# Patient Record
Sex: FEMALE | Race: ASIAN | NOT HISPANIC OR LATINO
[De-identification: names, ages, dates, MRNs, and addresses within clinical notes are randomized per-mention and may not be internally consistent; named-entity substitution may affect disease eponyms.]

---

## 2017-07-22 PROBLEM — Z00.00 ENCOUNTER FOR PREVENTIVE HEALTH EXAMINATION: Status: ACTIVE | Noted: 2017-07-22

## 2017-08-03 ENCOUNTER — APPOINTMENT (OUTPATIENT)
Dept: ORTHOPEDIC SURGERY | Facility: CLINIC | Age: 22
End: 2017-08-03
Payer: COMMERCIAL

## 2017-08-03 VITALS — WEIGHT: 135 LBS | HEIGHT: 61 IN | BODY MASS INDEX: 25.49 KG/M2

## 2017-08-03 DIAGNOSIS — M22.42 CHONDROMALACIA PATELLAE, LEFT KNEE: ICD-10-CM

## 2017-08-03 PROCEDURE — 73562 X-RAY EXAM OF KNEE 3: CPT | Mod: LT

## 2017-08-03 PROCEDURE — 99203 OFFICE O/P NEW LOW 30 MIN: CPT

## 2017-08-05 ENCOUNTER — EMERGENCY (EMERGENCY)
Facility: HOSPITAL | Age: 22
LOS: 1 days | Discharge: LEFT BEFORE DISCHARGE | End: 2017-08-05
Admitting: EMERGENCY MEDICINE

## 2017-08-05 VITALS
OXYGEN SATURATION: 100 % | DIASTOLIC BLOOD PRESSURE: 62 MMHG | SYSTOLIC BLOOD PRESSURE: 109 MMHG | HEART RATE: 64 BPM | RESPIRATION RATE: 18 BRPM | TEMPERATURE: 98 F

## 2017-08-05 NOTE — ED ADULT TRIAGE NOTE - CHIEF COMPLAINT QUOTE
allergic reaction    pt c/o redness, swelling, hives to legs and chest for 1 hour. denies diff breathing, throat swelling.  denies any new foods, pets, detergents... took 50mg benadryl 1 hr ago without relief

## 2018-10-09 ENCOUNTER — EMERGENCY (EMERGENCY)
Facility: HOSPITAL | Age: 23
LOS: 1 days | Discharge: HOME | End: 2018-10-09
Attending: EMERGENCY MEDICINE
Payer: COMMERCIAL

## 2018-10-09 VITALS
RESPIRATION RATE: 18 BRPM | DIASTOLIC BLOOD PRESSURE: 87 MMHG | HEIGHT: 61 IN | SYSTOLIC BLOOD PRESSURE: 145 MMHG | TEMPERATURE: 98 F | WEIGHT: 141.98 LBS | OXYGEN SATURATION: 98 % | HEART RATE: 73 BPM

## 2018-10-09 PROCEDURE — 99282 EMERGENCY DEPT VISIT SF MDM: CPT

## 2018-10-09 PROCEDURE — 82962 GLUCOSE BLOOD TEST: CPT

## 2018-10-09 PROCEDURE — 99283 EMERGENCY DEPT VISIT LOW MDM: CPT

## 2018-10-09 NOTE — ED PROVIDER NOTE - MEDICAL DECISION MAKING DETAILS
MD Leola,Attending: pt seen. agree with above HPI/ROS/PE. transistory migratory paresthesias not present at time of exam. H&P otherwise unremarkable including neuro exam. [facility] WNL. discharge home for PCP followup and return instructions MD Leola,Attending: pt seen. agree with above HPI/ROS/PE. transistory migratory paresthesias not present at time of exam. H&P otherwise unremarkable including neuro exam, save possibly mild subjective decreased sensation along corner L mouth. Doubt MS due to transient and brief nature of sxs--though consider if progression. . [facility] WNL. discharge home for PCP followup and neuro referral if felt necessary at that time and return instructions.

## 2018-10-09 NOTE — ED PROVIDER NOTE - PLAN OF CARE
Please follow up with your Primary MD in 24-48 hr.  Continue all home medications as directed.  Seek immediate medical care for any new/worsening signs or symptoms including but not limited to new numbness or tingling, weakness, difficulty walking, speaking or swallowing, unsteadiness on your feet, vomiting, headache, change in vision, or if any concerning or questionable symptoms.

## 2018-10-09 NOTE — ED ADULT TRIAGE NOTE - CHIEF COMPLAINT QUOTE
L sided "tingling" started @ 1500; BEFAST negative in triage; seen @ urgent care and sent to the ed; also reports weakness to L side of face which has resolved as per pt

## 2018-10-09 NOTE — ED PROVIDER NOTE - NSFOLLOWUPINSTRUCTIONS_ED_ALL_ED_FT
See your doctor later this week for recheck and referral to Neurology for possible MRI if symptoms persist. return for weakness/blurred vision/dizziness/worsening numbness or other concerns

## 2018-10-09 NOTE — ED ADULT NURSE NOTE - NSIMPLEMENTINTERV_GEN_ALL_ED
Implemented All Universal Safety Interventions:  [place] to call system. Call bell, personal items and telephone within reach. Instruct patient to call for assistance. Room bathroom lighting operational. Non-slip footwear when patient is off stretcher. Physically safe environment: no spills, clutter or unnecessary equipment. Stretcher in lowest position, wheels locked, appropriate side rails in place.

## 2018-10-09 NOTE — ED PROVIDER NOTE - PHYSICAL EXAMINATION
GEN: Pt in NAD, A&O x3. GCS 15  EYES: Sclera white w/o injection, PERRLA, EOMI.  ENT: Head NCAT. Nose without deformity, turbinates without edema or erythema, no DC. No auricular TTP. Mouth and pharynx without erythema or exudates, uvula midline, no tonsillar enlargement. Neck supple FROM.   RESP: No chest wall tenderness, CTA b/l, no wheezes, rales, or rhonchi.   CARDIAC: RRR, clear distinct S1, S2, no S3, S4, murmurs, gallops, or rubs.   ABD: Abdomen non-distended, soft, non-tender, no rebound or guarding, organomegaly or masses. No CVAT b/l.  VASC: 2+ carotid, radial, and dorsalis pedis pulses b/l. No edema or tenderness of the lower extremities.  NEURO: CN 2-12 grossly intact. Normal and equal sensation UE, LE and face b/l. 5/5 strength UE and LE b/l.  Romberg negative, pronator drift negative. Normal gait and gross cerebellar functioning.  SKIN: No rashes noted.

## 2018-10-09 NOTE — ED PROVIDER NOTE - CARE PLAN
Principal Discharge DX:	Paresthesia Principal Discharge DX:	Paresthesia  Assessment and plan of treatment:	Please follow up with your Primary MD in 24-48 hr.  Continue all home medications as directed.  Seek immediate medical care for any new/worsening signs or symptoms including but not limited to new numbness or tingling, weakness, difficulty walking, speaking or swallowing, unsteadiness on your feet, vomiting, headache, change in vision, or if any concerning or questionable symptoms.

## 2023-05-26 ENCOUNTER — NON-APPOINTMENT (OUTPATIENT)
Age: 28
End: 2023-05-26